# Patient Record
(demographics unavailable — no encounter records)

---

## 2025-03-03 NOTE — ASSESSMENT
[FreeTextEntry1] : 43 yo F with mass at the hairline just behind right ear that is bothersome.  Recommend local excision in office today.  After informed consent was signed by the patient and witnessed by the medical assistant, the area was prepped with Betadine and 5cc of 1% lidocaine with epinephrine were used to anesthetize the skin. A 15 blade was then used to excise an ellipse of skin overlying the center of the mass. Dissection was carried out through the dermis sharply and the intact subcutaneous mass was dissected out using Metzenbaum scissors. Once the specimen was freed, it was sent labeled to pathology in [place]. The wound bed was inspected for hemostasis and controlled with cautery. Once hemostasis was adequate, the wound was closed in 2 layers with 3-0 and 4-0 Vicryl suture. The wound was dressed with Dermabond. The patient tolerated the procedure well.  Wound care instructions provided.

## 2025-03-03 NOTE — HISTORY OF PRESENT ILLNESS
[de-identified] : 43 yo F with h/o breast ca presents with chronic lump behind her ear. She has had it for many years and used to be able to drain it herself but not for some time. It has been enlarging slowly. She would like to have it removed.

## 2025-03-03 NOTE — PHYSICAL EXAM
[Alert] : alert [Calm] : calm [de-identified] : NAD [de-identified] : posterior to right ear, at the hairline, there is a ~ 1 cm noninflamed mobile mass

## 2025-07-28 NOTE — PHYSICAL EXAM
[de-identified] : L knee: - No obvious deformity or swelling - No pain with palpation of medial or lateral joint line. - ROM from 135 degrees of flexion to 0 degrees extension. - 5/5 strength with knee flexion and extension - Stable varus, valgus, Lachman's, posterior drawer testing - Negative Charanjit's - positive ppatellar grind - Distally neurovascularly intact.    R knee: - No obvious deformity or swelling - No pain with palpation of medial or lateral joint line. - ROM from 135 degrees of flexion to 0 degrees extension. - 5/5 strength with knee flexion and extension - Stable varus, valgus, Lachman's, posterior drawer testing - Distally neurovascularly intact.     [de-identified] : 3 views XR L knee without fracture, dislocation, or degenerative change.

## 2025-07-28 NOTE — HISTORY OF PRESENT ILLNESS
[de-identified] : 07/25/2025: Patient is a 44 year old female presenting for evaluation of L knee pain that hurts with knee flexion after using more weight with strength training. States pain has been present since Feb 2025. The pain is the same since it started. At rest, not much pain. With activity, the pain worsens with walking, upstairs, kneeling, squat. She has not noticed swelling. She has tried ibuprofen with some improvement. The pain radiates nowhere. She denies history of similar pain in the past. Hx of breast cancer, medical menopause.   Work: vet Exercise: weight lifting, swimming Medical Problems: osteoporosis Omega 3, Ca, vit D, lorsartan, anastrazole.

## 2025-07-28 NOTE — PHYSICAL EXAM
[de-identified] : L knee: - No obvious deformity or swelling - No pain with palpation of medial or lateral joint line. - ROM from 135 degrees of flexion to 0 degrees extension. - 5/5 strength with knee flexion and extension - Stable varus, valgus, Lachman's, posterior drawer testing - Negative Charanjit's - positive ppatellar grind - Distally neurovascularly intact.    R knee: - No obvious deformity or swelling - No pain with palpation of medial or lateral joint line. - ROM from 135 degrees of flexion to 0 degrees extension. - 5/5 strength with knee flexion and extension - Stable varus, valgus, Lachman's, posterior drawer testing - Distally neurovascularly intact.     [de-identified] : 3 views XR L knee without fracture, dislocation, or degenerative change.

## 2025-07-28 NOTE — ASSESSMENT
[FreeTextEntry1] : - pt referral - compression sleeve - exercise modifications - nsaid, tylenol, ice  - follow up in 6-8 weeks

## 2025-07-28 NOTE — REVIEW OF SYSTEMS
[Arthralgia] : arthralgia [Joint Pain] : joint pain [Joint Stiffness] : joint stiffness [Joint Swelling] : no joint swelling [Negative] : Integumentary

## 2025-07-28 NOTE — HISTORY OF PRESENT ILLNESS
[de-identified] : 07/25/2025: Patient is a 44 year old female presenting for evaluation of L knee pain that hurts with knee flexion after using more weight with strength training. States pain has been present since Feb 2025. The pain is the same since it started. At rest, not much pain. With activity, the pain worsens with walking, upstairs, kneeling, squat. She has not noticed swelling. She has tried ibuprofen with some improvement. The pain radiates nowhere. She denies history of similar pain in the past. Hx of breast cancer, medical menopause.   Work: vet Exercise: weight lifting, swimming Medical Problems: osteoporosis Omega 3, Ca, vit D, lorsartan, anastrazole.